# Patient Record
Sex: MALE | Race: WHITE | Employment: UNEMPLOYED | URBAN - METROPOLITAN AREA
[De-identification: names, ages, dates, MRNs, and addresses within clinical notes are randomized per-mention and may not be internally consistent; named-entity substitution may affect disease eponyms.]

---

## 2017-09-29 ENCOUNTER — HOSPITAL ENCOUNTER (OUTPATIENT)
Dept: PREADMISSION TESTING | Age: 52
Discharge: HOME | End: 2017-09-29
Payer: COMMERCIAL

## 2017-09-29 VITALS
HEIGHT: 71 IN | DIASTOLIC BLOOD PRESSURE: 95 MMHG | WEIGHT: 267 LBS | OXYGEN SATURATION: 97 % | HEART RATE: 91 BPM | SYSTOLIC BLOOD PRESSURE: 151 MMHG | BODY MASS INDEX: 37.38 KG/M2 | RESPIRATION RATE: 20 BRPM

## 2017-09-29 LAB
ABSOLUTE EOS #: 0.1 K/UL (ref 0–0.4)
ABSOLUTE LYMPH #: 2.4 K/UL (ref 1–4.8)
ABSOLUTE MONO #: 0.8 K/UL (ref 0.2–0.8)
BASOPHILS # BLD: 1 %
BASOPHILS ABSOLUTE: 0.1 K/UL (ref 0–0.2)
DIFFERENTIAL TYPE: NORMAL
EKG ATRIAL RATE: 87 BPM
EKG P AXIS: 70 DEGREES
EKG P-R INTERVAL: 148 MS
EKG Q-T INTERVAL: 360 MS
EKG QRS DURATION: 96 MS
EKG QTC CALCULATION (BAZETT): 433 MS
EKG R AXIS: 1 DEGREES
EKG T AXIS: 21 DEGREES
EKG VENTRICULAR RATE: 87 BPM
EOSINOPHILS RELATIVE PERCENT: 1 %
HCT VFR BLD CALC: 46.2 % (ref 41–53)
HEMOGLOBIN: 15.5 G/DL (ref 13.5–17.5)
LYMPHOCYTES # BLD: 25 %
MCH RBC QN AUTO: 30.4 PG (ref 26–34)
MCHC RBC AUTO-ENTMCNC: 33.6 G/DL (ref 31–37)
MCV RBC AUTO: 90.5 FL (ref 80–100)
MONOCYTES # BLD: 8 %
PDW BLD-RTO: 14.1 % (ref 11.5–14.5)
PLATELET # BLD: 245 K/UL (ref 130–400)
PLATELET ESTIMATE: NORMAL
PMV BLD AUTO: 8.8 FL (ref 6–12)
RBC # BLD: 5.1 M/UL (ref 4.5–5.9)
RBC # BLD: NORMAL 10*6/UL
SEG NEUTROPHILS: 65 %
SEGMENTED NEUTROPHILS ABSOLUTE COUNT: 6.3 K/UL (ref 1.8–7.7)
WBC # BLD: 9.8 K/UL (ref 3.5–11)
WBC # BLD: NORMAL 10*3/UL

## 2017-09-29 PROCEDURE — 85025 COMPLETE CBC W/AUTO DIFF WBC: CPT

## 2017-09-29 PROCEDURE — 36415 COLL VENOUS BLD VENIPUNCTURE: CPT

## 2017-09-29 PROCEDURE — 93005 ELECTROCARDIOGRAM TRACING: CPT

## 2017-09-29 RX ORDER — BUDESONIDE AND FORMOTEROL FUMARATE DIHYDRATE 160; 4.5 UG/1; UG/1
2 AEROSOL RESPIRATORY (INHALATION) 2 TIMES DAILY
COMMUNITY

## 2017-09-29 RX ORDER — ERGOCALCIFEROL (VITAMIN D2) 1250 MCG
50000 CAPSULE ORAL WEEKLY
COMMUNITY

## 2017-09-29 RX ORDER — NORTRIPTYLINE HYDROCHLORIDE 25 MG/1
25 CAPSULE ORAL NIGHTLY
COMMUNITY

## 2017-09-29 RX ORDER — PANTOPRAZOLE SODIUM 40 MG/1
40 TABLET, DELAYED RELEASE ORAL DAILY
COMMUNITY

## 2017-09-29 RX ORDER — GABAPENTIN 300 MG/1
300 CAPSULE ORAL 4 TIMES DAILY
COMMUNITY

## 2017-09-29 RX ORDER — DORZOLAMIDE HCL 20 MG/ML
2 SOLUTION/ DROPS OPHTHALMIC 3 TIMES DAILY
COMMUNITY

## 2017-09-29 NOTE — H&P
HISTORY and PHYSICAL    NAME:  David Hines  MRN: [id number]   YOB: 1965   Date: 9/29/2017   Age: 50 y.o. Gender: male         COMPLAINT AND PRESENT HISTORY: David Hines is a 50 y.o. male who is scheduled to have a left thigh muscle biopsy by Dr. Falguni Montoya on 10/3/17. His CPK level has been high (pt reports level of 500 with last labwork)  so this muscle has been chosen as the site for a biopsy for the elevated CPK problem. He complains of frequent muscle spasms of his rib areas and lower extremities for the past 3 years. He also has a history of DDD of the cervical spine with radicular symptoms of the left upper extremity. He denies any current chest pain or any cardiac or neurological symptoms. BP recheck 151/95. Diagnosis: Elevated CPK levels      Past Medical History:   Diagnosis Date    COPD (chronic obstructive pulmonary disease) ([address]     DDD (degenerative disc disease), cervical     GERD (gastroesophageal reflux disease)     Glaucoma     Neuropathy ([facility] [address]        Past Surgical History:   Procedure Laterality Date    TESTICLE SURGERY Right     cyst removed        Pt denies any history of diabetes, heart disease, stroke or cancer. History reviewed. No pertinent family history.       Social History     Social History    Marital status:      Spouse name: N/A    Number of children: N/A    Years of education: N/A     Social History Main Topics    Smoking status: Current Every Day Smoker     Packs/day: 0.50    Smokeless tobacco: None      Comment: reduced from two pack/day    Alcohol use 3.6 oz/week     6 Cans of beer per week      Comment: ocassionally    Drug use: None    Sexual activity: Not Asked     Other Topics Concern    None     Social History Narrative    None                 Review of Systems:    Allergies   Allergen Reactions    Penicillins Other (See Comments)     As a child           ROS:    GENERAL HEALTH:  Denies any problems with weight, appetite, or sleep.    Skin:  No itching or rashes. No lesions. No easy bruising or bleeding. HEENT:  Denies cephalgia or head injury. No eye or ear pain. No sinusitis, rhinorhea or epistaxis. No frequent sorethroat, dysphagia or hoarseness. No masses, pain, stiffness or injury to the neck. Cardio-Respiratory: No hemoptysis, shortness of breath, chest pain, dizziness, orthopnea or palpitations. Gastrointestinal:  No constipation, diarrhea, sd stools, red or black in the stool. No nausea or vomiting. Genitourinary:  No dysuria, hematuria, discharge, incontinence. No recent urinary tract infection. Locomotor:  Denies bone, joint or muscle  problems. No need for assistance with ambulation. Neuro:  Denies  neuropathy, syncopal episodes, weakness, vertigo, arthralgia, myalgia or numbness or tingling. Behavioral/Psych:  Pt denies current feeling of depression or significant anxiety. GENERAL PHYSICAL EXAM:            Vitals: BP (!) 156/102  Pulse 91  Resp 20  Ht 5' 11\" (1.803 m)  Wt 267 lb (121.1 kg)  SpO2 97%  BMI 37.24 kg/m2 Body mass index is 37.24 kg/(m^2). GENERAL APPEARANCE:  Maryan Flynn is a 50 y.o. male,  nourished, conscious, alert. Does not appear to be in distress or pain at this time. Skin:  Appears warm and dry without jaundice or cyanosis. No rashes or lesions. HEENT:  No facial swelling or tenderness. No  scleral icterus. No drainage from the ears, eyes or nose. Moist mucous membranes. No jugular vein distention. Carotid pulses present without bruit. Chest:  Symmetrical with equal expansion. Heart:  Regular rate and rhythm without murmur or rub. No extra heart sounds. Lungs: Clear to ausculation without rhonchi or wheeze. Nonlabored. Abdomen:  Soft, nontender. No flank pain with percussion. Lymphatics:  No palpable cervical or supraclavicular lymphadenopathy. Extremities:  Radial pulses 2+. Pedal pulses 2+. No edema. No calf tenderness. Negative garrets sign. Locomotor/ Back/Spine:  No para spinus tenderness. 5/5 strength upper and lower extremities. Neurological/Behavioral  Alert and oriented. Able to move all extremities. No facial droop. No slurred speech. Cranial nerves 2-12  grossly intact.                               Shazia Shah, CNP on 9/29/2017 at 10:22 AM

## 2017-09-29 NOTE — PRE-PROCEDURE INSTRUCTIONS
ARRIVE AT [facility] ON Tuesday, 10/03/2017 at 0600 AM    Once you enter the hospital lobby, take the elevators to the second floor. Check-In is at the surgery registration desk. If you have been given a blood band, you must bring it with you the day of surgery. Continue to take your home medications as you normally do up to and including the night before surgery with the exception of any blood thinning medications. Please stop any blood thinning medications as directed by your surgeon or prescribing physician. Failure to stop certain medications may interfere with your scheduled surgery. These may include:  Aspirin, Warfarin (Coumadin), Clopidogrel (Plavix), Ibuprofen (Motrin, Advil), Naproxen (Aleve), Meloxicam (Mobic), Celecoxib (Celebrex), Eliquis, Pradaxa, Xarelto, Effient, Fish Oil, Herbal supplements. If you are diabetic, do not take any of your diabetic medications by mouth the morning of surgery. If you are taking insulin contact the doctor that manages your diabetes for instructions about any changes to your insulin dosages the day before surgery. Do not inject insulin or other injectable diabetic medications the morning of surgery unless otherwise instructed by the doctor who manages your diabetes. Please take the following medication(s) the day of surgery with a small sip of water:  Gabapentin, inhaler, protonic, eye drops  Please use your inhalers at home the day of surgery. PREPARING FOR YOUR SURGERY:     Before surgery, you can play an important role in your own health. Because skin is not sterile, we need to be sure that your skin is as free of germs as possible before surgery by carefully washing before surgery. Preparing or prepping skin before surgery can reduce the risk of a surgical site infection.   Do not shave the area of your body where your surgery will be performed unless you received specific permission from your physician.     You will need to shower at home the night before surgery and the morning of surgery with a special soap called chlorhexidine gluconate (CHG*). *Not to be used by people allergic to Chlorhexidine Gluconate (CHG). Following these instructions will help you be sure that your skin is clean before surgery. Instructions on cleaning your skin before surgery: The night before your surgery:      You will need to shower with warm water (not hot) and the CHG soap.  Use a clean wash cloth and a clean towel. Have clean clothes available to put on after the shower.    First wash your hair with regular shampoo. Rinse your hair and body thoroughly to remove the shampoo. Niharika Sloop Wash your face with your regular soap or water only. Thoroughly rinse your body with warm water from the neck down.  Turn water off to prevent rinsing the soap off too soon.  With a clean wet washcloth and half of the CHG soap in the bottle, lather your entire body from the neck down. Do not use CHG soap near your eyes or ears to avoid injury to those areas.  Wash thoroughly, paying special attention to the area where your surgery will be performed.  Wash your body gently for five (5) minutes. Avoid scrubbing your skin too hard.  Turn the water back on and rinse your body thoroughly.  Pat yourself dry with a clean, soft towel. Do not apply lotion, cream or powder.  Dress with clean freshly washed clothes. The morning of surgery:     Repeat shower following steps above - using remaining half of CHG soap in bottle. Patient Instructions:    Niharika Sloop If you are having any type of anesthesia you are to have nothing to eat or drink after midnight the night before your surgery. This includes gum, mints, water or smoking or chewing tobacco.  The only exception to this is a small sip of water to take with any morning dose of heart, blood pressure, or seizure medications.   No alcoholic beverages for 24 hours prior to surgery.  Bring a list of all medications you take, along with the dose of the medications and how often you take it. If more convenient bring the pharmacy bottles in a zip lock bag.  Brush your teeth but do not swallow water.  Bring your eyeglasses and case with you. No contacts are to be worn the day of surgery. You also may bring your hearing aids.  If you are on C-PAP or Bi-PAP at home and plan on staying in the hospital overnight for your surgery please bring the machine with you. · Do not wear any jewelry or body piercings day of surgery. Also, NO lotion, perfume or deodorant to be used the day of surgery. No nail polish on the operative extremity (arm/leg surgeries)    · Do not bring any valuables such as jewelry, cash, or credit cards. If you are staying overnight with us, please bring a small bag of personal items.  Please wear loose, comfortable clothing. If you are potentially going to have a cast or brace bring clothing that will fit over them.  In case of illness - If you have cold or flu like symptoms (high fever, runny nose, sore throat, cough, etc.) rash, nausea, vomiting, loose stools, and/or recent contact with someone who has a contagious disease (chicken pox, measles, etc.) Please call your doctor before coming to the hospital.     If your child is having surgery please make arrangements for any other children to be cared for at home on the day of surgery. Other children are not permitted in recovery room and we want you to be able to spend time with the patient. If other arrangements are not available then we suggest that you have a second adult to stay in the waiting room. Day of Surgery/Procedure:    As a patient at [facility] you can expect quality medical and nursing care that is centered on your individual needs.   Our goal is to make your surgical experience as comfortable as possible    . Transportation After Your Surgery/Procedure: You will need a friend or family member to drive you home after your procedure. Your  must be 25years of age or older and able to sign off on your discharge instructions. A taxi cab or any other form of public transportation is not acceptable. Your friend or family member must stay at the hospital throughout your procedure. Someone must remain with you for the first 24 hours after your surgery if you receive anesthesia or medication. If you do not have someone to stay with you, your procedure may be cancelled.       If you have any other questions regarding your procedure or the day of surgery, please call [phone]      _________________________  ____________________________  Signature (Patient)              Signature (Provider) & date

## 2017-10-02 ENCOUNTER — ANESTHESIA EVENT (OUTPATIENT)
Dept: OPERATING ROOM | Age: 52
End: 2017-10-02
Payer: COMMERCIAL

## 2017-10-03 ENCOUNTER — ANESTHESIA (OUTPATIENT)
Dept: OPERATING ROOM | Age: 52
End: 2017-10-03
Payer: COMMERCIAL

## 2017-10-03 ENCOUNTER — HOSPITAL ENCOUNTER (OUTPATIENT)
Age: 52
Setting detail: OUTPATIENT SURGERY
Discharge: HOME | End: 2017-10-03
Attending: SURGERY | Admitting: SURGERY
Payer: COMMERCIAL

## 2017-10-03 VITALS — SYSTOLIC BLOOD PRESSURE: 138 MMHG | OXYGEN SATURATION: 96 % | TEMPERATURE: 89.8 F | DIASTOLIC BLOOD PRESSURE: 64 MMHG

## 2017-10-03 VITALS
DIASTOLIC BLOOD PRESSURE: 86 MMHG | HEART RATE: 82 BPM | TEMPERATURE: 96.4 F | RESPIRATION RATE: 13 BRPM | WEIGHT: 266.98 LBS | OXYGEN SATURATION: 96 % | SYSTOLIC BLOOD PRESSURE: 147 MMHG | HEIGHT: 71 IN | BODY MASS INDEX: 37.38 KG/M2

## 2017-10-03 PROCEDURE — 7100000011 HC PHASE II RECOVERY - ADDTL 15 MIN: Performed by: SURGERY

## 2017-10-03 PROCEDURE — 6360000002 HC RX W HCPCS: Performed by: NURSE ANESTHETIST, CERTIFIED REGISTERED

## 2017-10-03 PROCEDURE — 3700000000 HC ANESTHESIA ATTENDED CARE: Performed by: SURGERY

## 2017-10-03 PROCEDURE — 88344 IMHCHEM/IMCYTCHM EA MLT ANTB: CPT

## 2017-10-03 PROCEDURE — 2580000003 HC RX 258: Performed by: ANESTHESIOLOGY

## 2017-10-03 PROCEDURE — 88341 IMHCHEM/IMCYTCHM EA ADD ANTB: CPT

## 2017-10-03 PROCEDURE — 88305 TISSUE EXAM BY PATHOLOGIST: CPT

## 2017-10-03 PROCEDURE — A6258 TRANSPARENT FILM >16<=48 IN: HCPCS | Performed by: SURGERY

## 2017-10-03 PROCEDURE — 2580000003 HC RX 258: Performed by: NURSE ANESTHETIST, CERTIFIED REGISTERED

## 2017-10-03 PROCEDURE — 88342 IMHCHEM/IMCYTCHM 1ST ANTB: CPT

## 2017-10-03 PROCEDURE — 88314 HISTOCHEMICAL STAINS ADD-ON: CPT

## 2017-10-03 PROCEDURE — 88300 SURGICAL PATH GROSS: CPT

## 2017-10-03 PROCEDURE — 3700000001 HC ADD 15 MINUTES (ANESTHESIA): Performed by: SURGERY

## 2017-10-03 PROCEDURE — 88319 ENZYME HISTOCHEMISTRY: CPT

## 2017-10-03 PROCEDURE — 7100000010 HC PHASE II RECOVERY - FIRST 15 MIN: Performed by: SURGERY

## 2017-10-03 PROCEDURE — 3600000012 HC SURGERY LEVEL 2 ADDTL 15MIN: Performed by: SURGERY

## 2017-10-03 PROCEDURE — 2500000003 HC RX 250 WO HCPCS: Performed by: SURGERY

## 2017-10-03 PROCEDURE — 2500000003 HC RX 250 WO HCPCS: Performed by: NURSE ANESTHETIST, CERTIFIED REGISTERED

## 2017-10-03 PROCEDURE — 3600000002 HC SURGERY LEVEL 2 BASE: Performed by: SURGERY

## 2017-10-03 RX ORDER — PROPOFOL 10 MG/ML
INJECTION, EMULSION INTRAVENOUS PRN
Status: DISCONTINUED | OUTPATIENT
Start: 2017-10-03 | End: 2017-10-03 | Stop reason: SDUPTHER

## 2017-10-03 RX ORDER — FENTANYL CITRATE 50 UG/ML
25 INJECTION, SOLUTION INTRAMUSCULAR; INTRAVENOUS EVERY 5 MIN PRN
Status: DISCONTINUED | OUTPATIENT
Start: 2017-10-03 | End: 2017-10-05 | Stop reason: HOSPADM

## 2017-10-03 RX ORDER — SODIUM CHLORIDE 0.9 % (FLUSH) 0.9 %
10 SYRINGE (ML) INJECTION PRN
Status: DISCONTINUED | OUTPATIENT
Start: 2017-10-03 | End: 2017-10-05 | Stop reason: HOSPADM

## 2017-10-03 RX ORDER — LIDOCAINE HYDROCHLORIDE 10 MG/ML
1 INJECTION, SOLUTION EPIDURAL; INFILTRATION; INTRACAUDAL; PERINEURAL
Status: ACTIVE | OUTPATIENT
Start: 2017-10-03 | End: 2017-10-03

## 2017-10-03 RX ORDER — FENTANYL CITRATE 50 UG/ML
INJECTION, SOLUTION INTRAMUSCULAR; INTRAVENOUS PRN
Status: DISCONTINUED | OUTPATIENT
Start: 2017-10-03 | End: 2017-10-03 | Stop reason: SDUPTHER

## 2017-10-03 RX ORDER — LIDOCAINE HYDROCHLORIDE AND EPINEPHRINE 10; 10 MG/ML; UG/ML
INJECTION, SOLUTION INFILTRATION; PERINEURAL PRN
Status: DISCONTINUED | OUTPATIENT
Start: 2017-10-03 | End: 2017-10-05 | Stop reason: HOSPADM

## 2017-10-03 RX ORDER — SODIUM CHLORIDE, SODIUM LACTATE, POTASSIUM CHLORIDE, CALCIUM CHLORIDE 600; 310; 30; 20 MG/100ML; MG/100ML; MG/100ML; MG/100ML
INJECTION, SOLUTION INTRAVENOUS CONTINUOUS PRN
Status: DISCONTINUED | OUTPATIENT
Start: 2017-10-03 | End: 2017-10-03 | Stop reason: SDUPTHER

## 2017-10-03 RX ORDER — ONDANSETRON 2 MG/ML
INJECTION INTRAMUSCULAR; INTRAVENOUS PRN
Status: DISCONTINUED | OUTPATIENT
Start: 2017-10-03 | End: 2017-10-03 | Stop reason: SDUPTHER

## 2017-10-03 RX ORDER — SODIUM CHLORIDE 9 MG/ML
INJECTION, SOLUTION INTRAVENOUS CONTINUOUS
Status: DISCONTINUED | OUTPATIENT
Start: 2017-10-03 | End: 2017-10-03

## 2017-10-03 RX ORDER — MIDAZOLAM HYDROCHLORIDE 1 MG/ML
INJECTION INTRAMUSCULAR; INTRAVENOUS PRN
Status: DISCONTINUED | OUTPATIENT
Start: 2017-10-03 | End: 2017-10-03 | Stop reason: SDUPTHER

## 2017-10-03 RX ORDER — ONDANSETRON 2 MG/ML
4 INJECTION INTRAMUSCULAR; INTRAVENOUS
Status: ACTIVE | OUTPATIENT
Start: 2017-10-03 | End: 2017-10-03

## 2017-10-03 RX ORDER — SODIUM CHLORIDE, SODIUM LACTATE, POTASSIUM CHLORIDE, CALCIUM CHLORIDE 600; 310; 30; 20 MG/100ML; MG/100ML; MG/100ML; MG/100ML
INJECTION, SOLUTION INTRAVENOUS CONTINUOUS
Status: DISCONTINUED | OUTPATIENT
Start: 2017-10-03 | End: 2017-10-05 | Stop reason: HOSPADM

## 2017-10-03 RX ORDER — HYDROMORPHONE HCL 110MG/55ML
0.25 PATIENT CONTROLLED ANALGESIA SYRINGE INTRAVENOUS EVERY 5 MIN PRN
Status: DISCONTINUED | OUTPATIENT
Start: 2017-10-03 | End: 2017-10-05 | Stop reason: HOSPADM

## 2017-10-03 RX ORDER — LIDOCAINE HYDROCHLORIDE 20 MG/ML
INJECTION, SOLUTION EPIDURAL; INFILTRATION; INTRACAUDAL; PERINEURAL PRN
Status: DISCONTINUED | OUTPATIENT
Start: 2017-10-03 | End: 2017-10-03 | Stop reason: SDUPTHER

## 2017-10-03 RX ORDER — SODIUM CHLORIDE 0.9 % (FLUSH) 0.9 %
10 SYRINGE (ML) INJECTION EVERY 12 HOURS SCHEDULED
Status: DISCONTINUED | OUTPATIENT
Start: 2017-10-03 | End: 2017-10-05 | Stop reason: HOSPADM

## 2017-10-03 RX ORDER — PROPOFOL 10 MG/ML
INJECTION, EMULSION INTRAVENOUS CONTINUOUS PRN
Status: DISCONTINUED | OUTPATIENT
Start: 2017-10-03 | End: 2017-10-03 | Stop reason: SDUPTHER

## 2017-10-03 RX ORDER — HYDROCODONE BITARTRATE AND ACETAMINOPHEN 5; 325 MG/1; MG/1
1 TABLET ORAL EVERY 6 HOURS PRN
Qty: 20 TABLET | Refills: 0 | Status: SHIPPED | OUTPATIENT
Start: 2017-10-03 | End: 2017-10-10

## 2017-10-03 RX ORDER — FENTANYL CITRATE 50 UG/ML
50 INJECTION, SOLUTION INTRAMUSCULAR; INTRAVENOUS EVERY 5 MIN PRN
Status: DISCONTINUED | OUTPATIENT
Start: 2017-10-03 | End: 2017-10-05 | Stop reason: HOSPADM

## 2017-10-03 RX ORDER — HYDROMORPHONE HCL 110MG/55ML
0.5 PATIENT CONTROLLED ANALGESIA SYRINGE INTRAVENOUS EVERY 5 MIN PRN
Status: DISCONTINUED | OUTPATIENT
Start: 2017-10-03 | End: 2017-10-05 | Stop reason: HOSPADM

## 2017-10-03 RX ADMIN — MIDAZOLAM HYDROCHLORIDE 2 MG: 1 INJECTION, SOLUTION INTRAMUSCULAR; INTRAVENOUS at 07:11

## 2017-10-03 RX ADMIN — SODIUM CHLORIDE, POTASSIUM CHLORIDE, SODIUM LACTATE AND CALCIUM CHLORIDE: 600; 310; 30; 20 INJECTION, SOLUTION INTRAVENOUS at 06:35

## 2017-10-03 RX ADMIN — FENTANYL CITRATE 50 MCG: 50 INJECTION, SOLUTION INTRAMUSCULAR; INTRAVENOUS at 07:18

## 2017-10-03 RX ADMIN — PROPOFOL 100 MCG/KG/MIN: 10 INJECTION, EMULSION INTRAVENOUS at 07:20

## 2017-10-03 RX ADMIN — LIDOCAINE HYDROCHLORIDE 50 MG: 20 INJECTION, SOLUTION EPIDURAL; INFILTRATION; INTRACAUDAL; PERINEURAL at 07:17

## 2017-10-03 RX ADMIN — ONDANSETRON 4 MG: 2 INJECTION, SOLUTION INTRAMUSCULAR; INTRAVENOUS at 07:49

## 2017-10-03 RX ADMIN — PROPOFOL 50 MG: 10 INJECTION, EMULSION INTRAVENOUS at 07:17

## 2017-10-03 RX ADMIN — PROPOFOL 30 MG: 10 INJECTION, EMULSION INTRAVENOUS at 07:25

## 2017-10-03 RX ADMIN — SODIUM CHLORIDE, POTASSIUM CHLORIDE, SODIUM LACTATE AND CALCIUM CHLORIDE: 600; 310; 30; 20 INJECTION, SOLUTION INTRAVENOUS at 07:13

## 2017-10-03 RX ADMIN — FENTANYL CITRATE 50 MCG: 50 INJECTION, SOLUTION INTRAMUSCULAR; INTRAVENOUS at 07:34

## 2017-10-03 ASSESSMENT — PAIN - FUNCTIONAL ASSESSMENT: PAIN_FUNCTIONAL_ASSESSMENT: 0-10

## 2017-10-03 ASSESSMENT — PAIN SCALES - GENERAL
PAINLEVEL_OUTOF10: 0

## 2017-10-03 ASSESSMENT — ENCOUNTER SYMPTOMS: SHORTNESS OF BREATH: 0

## 2017-10-03 NOTE — DISCHARGE INSTR - DIET

## 2017-10-03 NOTE — OP NOTE
Operative Note      PATIENT NAME:  Zandra Ormond               MEDICAL RECORD NO. [id number]                DATE OF PROCEDURE:  10/3/2017  PRIMARY CARE PHYSICIAN:  Juan R Stanton MD  PREOPERATIVE DIAGNOSIS:  Myositis  POSTOPERATIVE DIAGNOSIS:  Same  PROCEDURE PERFORMED:  Muscle biopsy, left vastus lateralis muscle  SURGEON:  Petros Groves DO, FACS  ANESTHESIA:  Local and MAC  BLOOD LOSS:  <1 ml. SPECIMENS:  Muscle biopsy times two. COMPLICATIONS:  None immediately appreciated. DISCUSSION:  Shukri Fierro is a 50y.o. year old male who presented with signs and symptoms of chronic myositis. He was referred to us for muscle biopsy by his rheumatologist.. After history and physical examination was performed potential diagnostic and therapeutic modalities were discussed with the patient. Operative and nonoperative management was discussed. Risks, complications, benefits were reviewed. He was given the opportunity to ask questions. Once answered an informed consent was obtained. The patient was brought to the operating room on 10/3/2017. PROCEDURE:   The patient received DVT and GI prophylaxis. He was taken to the operating room, placed on the operative table in the supine position. MAC anesthesia was administered and the operative site was prepped and draped in usual sterile fashion. Timeout was called. After infiltration of lidocaine with epinephrine for local anesthesia an incision was made overlying the vastus lateralis muscle. Subcutaneous tissue was divided with electrocautery and the muscle investing fascia was identified and was opened sharply alongside the direction of the fibers. 2 muscle bundles were isolated using a hemostat and clamped with a muscle biopsy clamp and divided and passed off the table as specimen. The muscle fibers on either end were tied with 3-0 Vicryl. The area was inspected and was noted to be hemostatic. The area was irrigated and dried.   The investing fascia was closed with 3-0 Vicryl in continuous fashion. Subcu tissue was closed with 3-0 Vicryl in interrupted fashion and the skin was closed with 4-0 Vicryl in subcuticular fashion. Incision was washed and dried sterilely. Mastisol and Steri-Strips were applied and a pressure closing dressing was applied. The patient tolerated the procedure well. He was awakened and taken to recovery in stable condition. Instruments, needles, and sponges were counted twice at the end of the procedure and the counts were correct.

## 2017-10-03 NOTE — ANESTHESIA PRE PROCEDURE
Department of Anesthesiology  Preprocedure Note       Name:  David Washington   Age:  50 y.o.  :  1965                                          MRN:  [id number]         Date:  10/3/2017      Surgeon: Mykel Rodriguez):  Ivis Sol DO    Procedure: Procedure(s): MUSCLE BIOPSY LEFT THIGH     Medications prior to admission:   Prior to Admission medications    Medication Sig Start Date End Date Taking? Authorizing Provider   budesonide-formoterol (SYMBICORT) 160-4.5 MCG/ACT AERO Inhale 2 puffs into the lungs 2 times daily   Yes Historical Provider, MD   dorzolamide (TRUSOPT) 2 % ophthalmic solution 2 drops 3 times daily   Yes Historical Provider, MD   ergocalciferol (ERGOCALCIFEROL) 07828 units capsule Take 50,000 Units by mouth once a week   Yes Historical Provider, MD   gabapentin (NEURONTIN) 300 MG capsule Take 300 mg by mouth 4 times daily Indications: 2 capsules  each time   Yes Historical Provider, MD   nortriptyline (PAMELOR) 25 MG capsule Take 25 mg by mouth nightly Indications: takes 3 capsules   Yes Historical Provider, MD   pantoprazole (PROTONIX) 40 MG tablet Take 40 mg by mouth daily   Yes Historical Provider, MD   tiotropium (SPIRIVA) 18 MCG inhalation capsule Inhale 18 mcg into the lungs daily   Yes Historical Provider, MD       Current medications:    Current Facility-Administered Medications   Medication Dose Route Frequency Provider Last Rate Last Dose    lactated ringers infusion   Intravenous Continuous Leticia King  mL/hr at 10/03/17 0635      sodium chloride flush 0.9 % injection 10 mL  10 mL Intravenous 2 times per day Leticia King MD        sodium chloride flush 0.9 % injection 10 mL  10 mL Intravenous PRN Odalys Delgado MD        lidocaine PF 1 % injection 1 mL  1 mL Intradermal Once PRN Leticia King MD           Allergies:     Allergies   Allergen Reactions    Penicillins Other (See Comments)     As a child       Problem List:  There is no problem list on file for this found for: PROTIME, INR, APTT    HCG (If Applicable): No results found for: PREGTESTUR, PREGSERUM, HCG, HCGQUANT     ABGs: No results found for: PHART, PO2ART, OJH5DQA, OWK0CUV, BEART, M0ZCJYEA     Type & Screen (If Applicable):  No results found for: LABABO, LABRH    Anesthesia Evaluation   no history of anesthetic complications:   Airway: Mallampati: III  TM distance: >3 FB   Neck ROM: full  Mouth opening: > = 3 FB Dental: normal exam         Pulmonary: breath sounds clear to auscultation  (+) COPD:  sleep apnea: on CPAP,      (-) asthma and shortness of breath    ROS comment: Current 1 ppd   Cardiovascular:  Exercise tolerance: good (>4 METS),       (-) hypertension, past MI, dysrhythmias,  angina and  KWAN      Rhythm: regular  Rate: normal              Beta Blocker:  Not on Beta Blocker   Neuro/Psych:      (-) seizures and CVA  GI/Hepatic/Renal:   (+) GERD: well controlled,      (-) liver disease     Endo/Other:        (-) hypothyroidism, hyperthyroidism    Abdominal:   (+) obese,                  Anesthesia Plan    ASA 3     MAC     intravenous induction   Anesthetic plan and risks discussed with patient. Plan discussed with CRNA.   Attending anesthesiologist reviewed and agrees with Mio Griffiths MD   10/3/2017

## 2017-10-03 NOTE — ANESTHESIA POSTPROCEDURE EVALUATION
Department of Anesthesiology  Postprocedure Note    Patient: John Andrea  MRN: [id number]  YOB: 1965  Date of evaluation: 10/3/2017  Time:  9:16 AM     Procedure Summary     Date Anesthesia Start Anesthesia Stop Room / Location    10/03/17 0713 0818 [facility] / [facility]       Procedure Diagnosis Surgeon Responsible Provider    MUSCLE BIOPSY LEFT THIGH  (Left Thigh) (DX ELEVATED ALDOLASE LEVEL) DO Renzo Montana MD          Anesthesia Type: MAC    Aylin Phase I:      Aylin Phase II: Aylin Score: 10    Last vitals:  BP (!) 148/92 (10/03/17 0845)    Temp 96.4 °F (35.8 °C) (10/03/17 0851)    Pulse 81 (10/03/17 0845)   Resp 13 (10/03/17 0845)    SpO2 97 % (10/03/17 0845)      Anesthesia Post Evaluation    Patient location during evaluation: PACU  Patient participation: complete - patient participated  Level of consciousness: awake and alert  Airway patency: patent  Nausea & Vomiting: no nausea and no vomiting  Complications: no  Cardiovascular status: blood pressure returned to baseline  Respiratory status: acceptable  Hydration status: euvolemic

## 2017-10-03 NOTE — INTERVAL H&P NOTE
History and Physical Update    Pt Name: Alfredo Ortega  MRN: [id number]  YOB: 1965  Date of evaluation: 10/3/2017      [x] I have reviewed the H&P done by Soraida Vale CNP which meets the criteria for an Interval History and Physical note and is attached below. [x] I have examined  Alfredo Ortega There are no changes to the patient or plans for a Left thigh muscle biopsy by Dr Faye Check for elevated CPK. The patient denies fever, chills, productive cough, SOB, wheezing, open sores or rash. Vital signs: BP (!) 155/94  Pulse 86  Temp 97.7 °F (36.5 °C)  Resp 14  SpO2 96%    Allergies:  Penicillins    Medications:   No current facility-administered medications on file prior to encounter. No current outpatient prescriptions on file prior to encounter. Prior to Admission medications    Medication Sig Start Date End Date Taking? Authorizing Provider   budesonide-formoterol (SYMBICORT) 160-4.5 MCG/ACT AERO Inhale 2 puffs into the lungs 2 times daily    Historical Provider, MD   dorzolamide (TRUSOPT) 2 % ophthalmic solution 2 drops 3 times daily    Historical Provider, MD   ergocalciferol (ERGOCALCIFEROL) 67456 units capsule Take 50,000 Units by mouth once a week    Historical Provider, MD   gabapentin (NEURONTIN) 300 MG capsule Take 300 mg by mouth 4 times daily Indications: 2 capsules  each time    Historical Provider, MD   nortriptyline (PAMELOR) 25 MG capsule Take 25 mg by mouth nightly Indications: takes 3 capsules    Historical Provider, MD   pantoprazole (PROTONIX) 40 MG tablet Take 40 mg by mouth daily    Historical Provider, MD   tiotropium (SPIRIVA) 18 MCG inhalation capsule Inhale 18 mcg into the lungs daily    Historical Provider, MD       This is a 50 y.o. male who is pleasant, cooperative, alert and oriented x3, in no acute distress. Heart: Heart sounds are normal.  HR 86 regular rate and rhythm without murmur, gallop or rub.    Lungs:Equal expansion, normal effort, unlabored with scattered rhonchi that clear past coughing otherwise clear to auscultation without wheezes   Abdomen: Obese, round, moderately firm, nontender, nondistended with bowel sounds present      Labs:  Recent Labs      09/29/17   1033   HGB  15.5   HCT  46.2   WBC  9.8   MCV  90.5   PLT  245       Rand Holder ANP-BC  Electronically signed 10/3/2017 at 6:22 AM

## 2017-10-03 NOTE — H&P (VIEW-ONLY)
HISTORY and PHYSICAL    NAME:  Je Barillas  MRN: [id number]   YOB: 1965   Date: 9/29/2017   Age: 50 y.o. Gender: male         COMPLAINT AND PRESENT HISTORY: Je Barillas is a 50 y.o. male who is scheduled to have a left thigh muscle biopsy by Dr. Stone Gage on 10/3/17. His CPK level has been high (pt reports level of 500 with last labwork)  so this muscle has been chosen as the site for a biopsy for the elevated CPK problem. He complains of frequent muscle spasms of his rib areas and lower extremities for the past 3 years. He also has a history of DDD of the cervical spine with radicular symptoms of the left upper extremity. He denies any current chest pain or any cardiac or neurological symptoms. BP recheck 151/95. Diagnosis: Elevated CPK levels      Past Medical History:   Diagnosis Date    COPD (chronic obstructive [facility]) [facility] [address]     DDD (degenerative disc disease), cervical     GERD (gastroesophageal reflux disease)     Glaucoma     Neuropathy ([facility] [address]        Past Surgical History:   Procedure Laterality Date    TESTICLE SURGERY Right     cyst removed        Pt denies any history of diabetes, heart disease, stroke or cancer. History reviewed. No pertinent family history.       Social History     Social History    Marital status:      Spouse name: N/A    Number of children: N/A    Years of education: N/A     Social History Main Topics    Smoking status: Current Every Day Smoker     Packs/day: 0.50    Smokeless tobacco: None      Comment: reduced from two pack/day    Alcohol use 3.6 oz/week     6 Cans of beer per week      Comment: ocassionally    Drug use: None    Sexual activity: Not Asked     Other Topics Concern    None     Social History Narrative    None                 Review of Systems:    Allergies   Allergen Reactions    Penicillins Other (See Comments)     As a child           ROS:    GENERAL HEALTH:  Denies any problems with weight, appetite, or sleep.    Skin:  No itching or rashes. No lesions. No easy bruising or bleeding. HEENT:  Denies cephalgia or head injury. No eye or ear pain. No sinusitis, rhinorhea or epistaxis. No frequent sorethroat, dysphagia or hoarseness. No masses, pain, stiffness or injury to the neck. Cardio-Respiratory: No hemoptysis, shortness of breath, chest pain, dizziness, orthopnea or palpitations. Gastrointestinal:  No constipation, diarrhea, sd stools, red or black in the stool. No nausea or vomiting. Genitourinary:  No dysuria, hematuria, discharge, incontinence. No recent urinary tract infection. Locomotor:  Denies bone, joint or muscle  problems. No need for assistance with ambulation. Neuro:  Denies  neuropathy, syncopal episodes, weakness, vertigo, arthralgia, myalgia or numbness or tingling. Behavioral/Psych:  Pt denies current feeling of depression or significant anxiety. GENERAL PHYSICAL EXAM:            Vitals: BP (!) 156/102  Pulse 91  Resp 20  Ht 5' 11\" (1.803 m)  Wt 267 lb (121.1 kg)  SpO2 97%  BMI 37.24 kg/m2 Body mass index is 37.24 kg/(m^2). GENERAL APPEARANCE:  Uche Rodriguez is a 50 y.o. male,  nourished, conscious, alert. Does not appear to be in distress or pain at this time. Skin:  Appears warm and dry without jaundice or cyanosis. No rashes or lesions. HEENT:  No facial swelling or tenderness. No  scleral icterus. No drainage from the ears, eyes or nose. Moist mucous membranes. No jugular vein distention. Carotid pulses present without bruit. Chest:  Symmetrical with equal expansion. Heart:  Regular rate and rhythm without murmur or rub. No extra heart sounds. Lungs: Clear to ausculation without rhonchi or wheeze. Nonlabored. Abdomen:  Soft, nontender. No flank pain with percussion. Lymphatics:  No palpable cervical or supraclavicular lymphadenopathy. Extremities:  Radial pulses 2+. Pedal pulses 2+.

## 2017-10-30 LAB — SURGICAL PATHOLOGY REPORT: NORMAL

## (undated) DEVICE — 3M™ TEGADERM™ TRANSPARENT FILM DRESSING FRAME STYLE, 1626W, 4 IN X 4-3/4 IN (10 CM X 12 CM), 50/CT 4CT/CASE: Brand: 3M™ TEGADERM™

## (undated) DEVICE — Z CONVERTED USE 2271043 CONTAINER SPEC COLL 4OZ SCR ON LID PEEL PCH

## (undated) DEVICE — FILTER CLP DISP FOR 5513E CLIPVAC

## (undated) DEVICE — CONVERTED USE 248063 TOWELS OR BL ST

## (undated) DEVICE — GLOVE SURG SZ 7 L12IN FNGR THK87MIL WHT LTX FREE

## (undated) DEVICE — PAD,NON-ADHERENT,3X8,STERILE,LF,1/PK: Brand: MEDLINE

## (undated) DEVICE — GAUZE,SPONGE,FLUFF,6"X6.75",STRL,5/TRAY: Brand: MEDLINE

## (undated) DEVICE — STRIP,CLOSURE,WOUND,MEDI-STRIP,1/2X4: Brand: MEDLINE

## (undated) DEVICE — 3M™ WARMING BLANKET, UPPER BODY, 10 PER CASE, 42268: Brand: BAIR HUGGER™

## (undated) DEVICE — DISCONTINUED USE 405792 GLOVE SURG SENSICARE ALOE LT LF PF ST GRN SZ 7

## (undated) DEVICE — MASTISOL ADHESIVE LIQ 2/3ML

## (undated) DEVICE — INTENDED FOR TISSUE SEPARATION, AND OTHER PROCEDURES THAT REQUIRE A SHARP SURGICAL BLADE TO PUNCTURE OR CUT.: Brand: BARD-PARKER ® CARBON RIB-BACK BLADES

## (undated) DEVICE — MEDI-VAC NON-CONDUCTIVE SUCTION TUBING: Brand: CARDINAL HEALTH

## (undated) DEVICE — BLADE CLIPPER GEN PURP NS

## (undated) DEVICE — CONVERTED USE 338908 SPONGES LAP 18X18 ST

## (undated) DEVICE — SHEET, T, LAPAROTOMY, STERILE: Brand: MEDLINE

## (undated) DEVICE — GOWN,AURORA,NONREINFORCED,LARGE: Brand: MEDLINE

## (undated) DEVICE — MEDI-VAC SUCTION HANDLE REGULAR CAPACITY: Brand: CARDINAL HEALTH

## (undated) DEVICE — SUTURE VCRL SZ 3-0 L54IN ABSRB UD LIGAPAK REEL POLYGLACTIN J285G

## (undated) DEVICE — GLOVE SURG SZ 65 L12IN FNGR THK87MIL WHT LTX FREE

## (undated) DEVICE — Z DISCONTINUED USE 2624853 GLOVE SURG SZ 75 L12IN THK91MIL BRN LTX FREE

## (undated) DEVICE — GLOVE SURG SZ 75 L12IN FNGR THK87MIL WHT LTX FREE

## (undated) DEVICE — CHLORAPREP 26ML ORANGE

## (undated) DEVICE — Z DISCONTINUED USE 2275676 GLOVE SURG SZ 65 L12IN FNGR THK87MIL DK GRN LTX FREE ISOLEX

## (undated) DEVICE — DRAPE,REIN 53X77,STERILE: Brand: MEDLINE

## (undated) DEVICE — SYRINGE BLB 2 PC STER 50